# Patient Record
Sex: MALE | Race: WHITE | URBAN - METROPOLITAN AREA
[De-identification: names, ages, dates, MRNs, and addresses within clinical notes are randomized per-mention and may not be internally consistent; named-entity substitution may affect disease eponyms.]

---

## 2018-01-29 ENCOUNTER — HISTORICAL (OUTPATIENT)
Dept: ADMINISTRATIVE | Facility: HOSPITAL | Age: 53
End: 2018-01-29

## 2019-10-04 ENCOUNTER — HISTORICAL (OUTPATIENT)
Dept: LAB | Facility: HOSPITAL | Age: 54
End: 2019-10-04

## 2019-10-04 LAB
ALBUMIN SERPL-MCNC: 4 GM/DL (ref 3.4–5)
ALP SERPL-CCNC: 76 UNIT/L (ref 45–117)
ALT SERPL-CCNC: 68 UNIT/L (ref 16–61)
AST SERPL-CCNC: 40 UNIT/L (ref 15–37)
BILIRUB SERPL-MCNC: 0.4 MG/DL (ref 0.2–1)
BILIRUBIN DIRECT+TOT PNL SERPL-MCNC: 0.12 MG/DL (ref 0–0.2)
BILIRUBIN DIRECT+TOT PNL SERPL-MCNC: 0.28 MG/DL (ref 0–1)
CHOLEST SERPL-MCNC: 171 MG/DL (ref 0–199)
CHOLEST/HDLC SERPL: 4 MG/DL (ref 0–8)
HDLC SERPL-MCNC: 45 MG/DL
LDLC SERPL CALC-MCNC: 95 MG/DL (ref 0–129)
PROT SERPL-MCNC: 7.5 GM/DL (ref 6.4–8.2)
TRIGL SERPL-MCNC: 155 MG/DL (ref 0–149)
VLDLC SERPL CALC-MCNC: 31 MG/DL

## 2022-04-11 ENCOUNTER — HISTORICAL (OUTPATIENT)
Dept: ADMINISTRATIVE | Facility: HOSPITAL | Age: 57
End: 2022-04-11

## 2022-04-25 VITALS
SYSTOLIC BLOOD PRESSURE: 120 MMHG | WEIGHT: 224.88 LBS | DIASTOLIC BLOOD PRESSURE: 80 MMHG | HEIGHT: 69 IN | BODY MASS INDEX: 33.31 KG/M2

## 2022-05-05 NOTE — HISTORICAL [facility] CERNER
This is a historical note converted from Sarita. Formatting and pictures may have been removed.  Please reference Sarita for original formatting and attached multimedia. Chief Complaint  New patient here with Right shoulder pain x3 months. No injury. Pain with diff activity. Xrays today.  History of Present Illness  He is a pleasant 52-year-old male whose had a?right shoulder pain since October 2017.? The pain is located lateral and anterior.? He denies any specific injury. ?Is worse with overhead motion. ?Somewhat better with rest.? Ibuprofen helps the pain. ?He has had no therapy. ?He has had no injections.? He denies any numbness or tingling  Review of Systems  Comprehensive review of system?was performed with no exceptions other than noted in the history of present illness  Physical Exam  Vitals & Measurements  BP:?120/80?  HT:?176?cm? HT:?176?cm? HT:?176?cm? WT:?102?kg? WT:?102?kg? WT:?102?kg? BMI:?32.93?  Shoulder Exam:??????????Right??????????Left  Skin:??????????????????????????????Normal???????Normal  AC joint tenderness:??????????None??????????None  Forward Flexion:?????????????170 ??????????180  Abduction:?????????????????????180??????????? 180  External Rotation: ????????????? 80??????????????80  Internal Rotation?????????????? 80 ???????????? 80  Supraspinatus stress test?????? Neg??????????Neg  Leo Impingement:????????+?????????????Neg  Neer Impingement:?????????????+??????????Neg  Apprehension:???????????????????? Neg??????????Neg  OBriens:????????????????????????????Neg????????? Neg  Speeds test:??????????????????????? Neg??????????Neg  Strength:  External Rotation:???????????????5/5???????????????5/5  Lift Off/belly press:????????????5/5???????????????5/5  ?   N-V status:?????????????????????????Intact??????????Intact  ?   C-spine: Normal ROM, NT  ?  ?  Assessment/Plan  1.?Shoulder bursitis  ? We will try an injection today. ?If his pain persists?we can consider formal physical therapy or an  MRI  ?  Risks of cortisone were discussed with the patient including hypopigmentation subcutaneous fat atrophy, and post injection pain flare. The patient understood these risks and requested to proceed. The right shoulder was prepped with betadine. The 1cc of steroid and 5cc of lidocaine injection was administered under sterile techinique. The injection was administered in clinic by [facility], and the patient tolerated the procedure well.  ?  Ordered:  betamethasone, 12 mg, Intra-Articular, Once, first dose 01/29/18 11:00:00 CST, stop date 01/29/18 11:00:00 CST  Lidocaine inj., 2 mL, Intra-Articular, Once, first dose 01/29/18 10:46:00 CST, stop date 01/29/18 10:46:00 CST  asp/inj jnt/bursa, major [id number] PC, 01/29/18 10:46:00 CST, [facility], Routine, 01/29/18 10:46:00 CST  Office/Outpatient Visit Level 3 New [id number] PC, Shoulder bursitis, [facility], 01/29/18 10:46:00 CST  ?  Orders:  Clinic Follow-up PRN, 01/29/18 10:46:00 CST, Future Order, [facility]  XR Shoulder Right Minimum 2 Views, Routine, 01/29/18 10:18:00 CST, Pain, None, Ambulatory, Rad Type, Right shoulder pain, Not Scheduled, 01/29/18 10:18:00 CST   Problem List/Past Medical History  Ongoing  High cholesterol  Hypertension  Obesity  Historical  Procedure/Surgical History  None  Medications  ATORVASTATIN 20 MG TABLET, 20 mg= 1 tab(s), Oral, qPM  Celestone, 12 mg, Intra-Articular, Once  lidocaine 2% injectable solution, 2 mL, Intra-Articular, Once  LISINOPRIL-HCTZ 10-12.5 MG TAB, 1 tab(s), Oral, Daily  Allergies  No Known Medication Allergies  Social History  Alcohol - 01/29/2018  Current, Wine, 1-2 times per week  Employment/School - 01/29/2018  Employed  Substance Abuse - 01/29/2018  Never  Tobacco - 01/29/2018  Never smoker  Family History  Diabetes mellitus type 1.: Mother.  Diagnostic Results  Shoulder radiographs 1/29/2018:?4 views of the shoulder show no arthrosis